# Patient Record
Sex: MALE | Race: WHITE | NOT HISPANIC OR LATINO | URBAN - NONMETROPOLITAN AREA
[De-identification: names, ages, dates, MRNs, and addresses within clinical notes are randomized per-mention and may not be internally consistent; named-entity substitution may affect disease eponyms.]

---

## 2019-06-27 ENCOUNTER — IMPORTED ENCOUNTER (OUTPATIENT)
Dept: URBAN - NONMETROPOLITAN AREA CLINIC 1 | Facility: CLINIC | Age: 24
End: 2019-06-27

## 2019-06-27 PROBLEM — H52.13: Noted: 2019-06-27

## 2019-06-27 PROCEDURE — 92310 CONTACT LENS FITTING OU: CPT

## 2019-06-27 PROCEDURE — S0620 ROUTINE OPHTHALMOLOGICAL EXA: HCPCS

## 2019-09-27 NOTE — PATIENT DISCUSSION
9/27/19 STILL COMPLAINS OF LEFT TO THE CENTRAL VISION SCOTOMA, TO DR Lacey Cassidy FOR SECOND OPINION. ON FA NO CHANGE.

## 2020-10-06 ENCOUNTER — IMPORTED ENCOUNTER (OUTPATIENT)
Dept: URBAN - NONMETROPOLITAN AREA CLINIC 1 | Facility: CLINIC | Age: 25
End: 2020-10-06

## 2020-10-06 PROBLEM — T15.02XA: Noted: 2020-10-06

## 2020-10-06 PROBLEM — H52.13: Noted: 2020-10-06

## 2020-10-06 PROCEDURE — 65222 REMOVE FOREIGN BODY FROM EYE: CPT

## 2020-10-06 NOTE — PATIENT DISCUSSION
Foreign body OS- Discussed diagnosis in detail with patient - Removed a piece of metal from OS today at slitlamp with forcepts with out complications with verbal consent from patient - McCook brush used today at slit lamp to remove rust - Start Besivance TID OS sample given today - Continue to monitor ------------------------------------previos notes -------------------------Myopia-Discussed diagnosis with patient. -Explained that people who are myopic are at a higher risk for developing RD/RT and reviewed associated S&S.-Pt to contact our office if symptoms develop. Updated spec Rx given. Recommend lens that will provide comfort as well as protect safety and health of eyes. CL wear-CLs fit and center well.-Stressed that patient should not sleep in CL. -Updated CL Rx given. -CL care and precautions given.

## 2020-11-03 ENCOUNTER — IMPORTED ENCOUNTER (OUTPATIENT)
Dept: URBAN - NONMETROPOLITAN AREA CLINIC 1 | Facility: CLINIC | Age: 25
End: 2020-11-03

## 2020-11-03 PROBLEM — H52.13: Noted: 2020-11-03

## 2020-11-03 PROCEDURE — 92015 DETERMINE REFRACTIVE STATE: CPT

## 2020-11-03 PROCEDURE — 92014 COMPRE OPH EXAM EST PT 1/>: CPT

## 2020-11-03 PROCEDURE — 92310 CONTACT LENS FITTING OU: CPT

## 2020-11-03 NOTE — PATIENT DISCUSSION
Myopia- Discussed diagnosis in detail with patient- New Glasses and CL RX given today- Continue to monitor- RTC 1 year complete

## 2022-04-10 ASSESSMENT — VISUAL ACUITY
OD_CC: J1+
OD_SC: J1+
OS_SC: 20/30
OD_SC: 20/30-2
OS_SC: J1+
OS_SC: 20/30-1
OD_SC: 20/30-
OS_CC: J1+

## 2022-04-10 ASSESSMENT — TONOMETRY
OS_IOP_MMHG: 16
OS_IOP_MMHG: 16
OD_IOP_MMHG: 16
OD_IOP_MMHG: 16

## 2022-05-12 ENCOUNTER — ESTABLISHED PATIENT (OUTPATIENT)
Dept: URBAN - NONMETROPOLITAN AREA CLINIC 1 | Facility: CLINIC | Age: 27
End: 2022-05-12

## 2022-05-12 DIAGNOSIS — H52.13: ICD-10-CM

## 2022-05-12 PROCEDURE — 92310 CONTACT LENS FITTING OU: CPT

## 2022-05-12 PROCEDURE — S0621 ROUTINE OPHTHALMOLOGICAL EXA: HCPCS

## 2022-05-12 ASSESSMENT — VISUAL ACUITY
OD_CC: 20/20
OS_CC: 20/20

## 2022-05-12 ASSESSMENT — TONOMETRY
OS_IOP_MMHG: 16
OD_IOP_MMHG: 14

## 2022-05-12 NOTE — PATIENT DISCUSSION
Myopia- Discussed diagnosis in detail with patient- New Glasses and CL RX given today- Continue to monitor- RTC 1 year complete.

## 2022-05-12 NOTE — PATIENT DISCUSSION
Patient given Rx for contacts. Patient is seeing 20/20 with previous RX so will not make any adjustments to CL at this time.